# Patient Record
Sex: MALE | Race: WHITE | URBAN - METROPOLITAN AREA
[De-identification: names, ages, dates, MRNs, and addresses within clinical notes are randomized per-mention and may not be internally consistent; named-entity substitution may affect disease eponyms.]

---

## 2018-04-12 ENCOUNTER — OFFICE VISIT (OUTPATIENT)
Dept: URGENT CARE | Facility: URGENT CARE | Age: 33
End: 2018-04-12
Payer: COMMERCIAL

## 2018-04-12 VITALS
HEART RATE: 72 BPM | WEIGHT: 192.4 LBS | OXYGEN SATURATION: 100 % | TEMPERATURE: 97.8 F | DIASTOLIC BLOOD PRESSURE: 67 MMHG | SYSTOLIC BLOOD PRESSURE: 127 MMHG

## 2018-04-12 DIAGNOSIS — S61.412A LACERATION OF LEFT HAND, FOREIGN BODY PRESENCE UNSPECIFIED, INITIAL ENCOUNTER: Primary | ICD-10-CM

## 2018-04-12 PROCEDURE — 99203 OFFICE O/P NEW LOW 30 MIN: CPT | Performed by: FAMILY MEDICINE

## 2018-04-12 NOTE — PROGRESS NOTES
SUBJECTIVE:                                                    Aris Lugo is a 32 year old male who presents to clinic today for the following health issues:    *Patient cut his left palm with a new  blade today about 1 hour ago.  The laceration is about 1 inch long.      Last tetanus booster within 10 years: yes     No Known Allergies    No past medical history on file.      No current outpatient prescriptions on file prior to visit.  No current facility-administered medications on file prior to visit.       ROS:  GEN: no fever or chills  CARDIAC: no chest pain or shortness of breath  SKIN: as above  Musculoskeletal: as above      OBJECTIVE:  Blood pressure 127/67, pulse 72, temperature 97.8  F (36.6  C), temperature source Tympanic, weight 192 lb 6.4 oz (87.3 kg), SpO2 100 %.     The patient appears today in no acute distress.  Laceration LOCATION: left palm   Size of laceration: 2.5 centimeters  Characteristics of the laceration: bleeding- mild, extends into subcutaneous fat and located in left palm thenar eminence  Tendon function, sensation intact. No weakness. Good pulses. Good range of motion  Cap refill intact.  Wound clean. No Foreign body noted.     Assessment:    ICD-10-CM    1. Laceration of left hand, foreign body presence unspecified, initial encounter S61.412A        PLAN:  Concern about laceration on a dangerous part of the hand on the thenar eminence, deep and close to the wrist.   Recommend ER evaluation.   Patient not sure which one to go to yet. He will call insurance.     Ana Laura Mendiola MD

## 2018-04-12 NOTE — MR AVS SNAPSHOT
"              After Visit Summary   2018    Aris Lugo    MRN: [id number]           Patient Information     Date Of Birth          1985        Visit Information        Provider Department      2018 5:05 PM Ana Laura Mendiola MD [facility]        Today's Diagnoses     Laceration of left hand, foreign body presence unspecified, initial encounter    -  1       Follow-ups after your visit        Who to contact     If you have questions or need follow up information about today's clinic visit or your schedule please contact [facility] directly at [phone].  Normal or non-critical lab and imaging results will be communicated to you by [employer], letter or phone within 4 business days after the clinic has received the results. If you do not hear from us within 7 days, please contact the clinic through [employer] or phone. If you have a critical or abnormal lab result, we will notify you by phone as soon as possible.  Submit refill requests through [employer] or call your pharmacy and they will forward the refill request to us. Please allow 3 business days for your refill to be completed.          Additional Information About Your Visit        MyChart Information     [employer] lets you send messages to your doctor, view your test results, renew your prescriptions, schedule appointments and more. To sign up, go to [email or web address] . Click on \"Log in\" on the left side of the screen, which will take you to the Welcome page. Then click on \"Sign up Now\" on the right side of the page.     You will be asked to enter the access code listed below, as well as some personal information. Please follow the directions to create your username and password.     Your access code is: [email or web address]  Expires: 2018  7:33 PM     Your access code will  in 90 days. If you need help or a new code, please call your [facility] or [phone].        Care EveryWhere ID     This " is your Care EveryWhere ID. This could be used by other organizations to access your [place] medical records  [id number]        Your Vitals Were     Pulse Temperature Pulse Oximetry             72 97.8  F (36.6  C) (Tympanic) 100%          Blood Pressure from Last 3 Encounters:   04/12/18 127/67    Weight from Last 3 Encounters:   04/12/18 192 lb 6.4 oz (87.3 kg)              Today, you had the following     No orders found for display       Primary Care Provider Office Phone # Fax #    [facility] [phone] [address] [facility] [address]        Equal Access to Services     MAUREEN MONSALVE : Hadii virgen mansfield hadasho Sobam, waaxda luqadaha, qaybta kaalmada adeegyada, robson ruiz . So [facility] [phone].    ATENCIÓN: Si habla español, tiene a escobar disposición servicios gratuitos de asistencia lingüística. [facility] [phone].    We comply with applicable federal civil rights laws and [place] laws. We do not discriminate on the basis of race, color, national origin, age, disability, sex, sexual orientation, or gender identity.            Thank you!     Thank you for choosing [facility]  for your care. Our goal is always to provide you with excellent care. Hearing back from our patients is one way we can continue to improve our services. Please take a few minutes to complete the written survey that you may receive in the mail after your visit with us. Thank you!             Your Updated Medication List - Protect others around you: Learn how to safely use, store and throw away your medicines at www.disposemymeds.org.      Notice  As of 4/12/2018  7:33 PM    You have not been prescribed any medications.

## 2018-11-19 ENCOUNTER — TELEPHONE (OUTPATIENT)
Dept: OTHER | Facility: CLINIC | Age: 33
End: 2018-11-19